# Patient Record
(demographics unavailable — no encounter records)

---

## 2024-10-09 NOTE — PHYSICAL EXAM
[Fully active, able to carry on all pre-disease performance without restriction] : Status 0 - Fully active, able to carry on all pre-disease performance without restriction [Normal] : affect appropriate [de-identified] : wears glasses

## 2024-10-09 NOTE — HISTORY OF PRESENT ILLNESS
[de-identified] : Mrs. Felton is a 72 year old female with R/R CLL (del 17p), with multiple prior therapies, most recently on Ibrutinib since 8/2014, who presents for routine follow-up. \par  \par  Disease: CLL, Dx 2000 \par  Tumor/ Prognostic Markers: mutated\par  CD38-, ZAP70-\par  del 13q, TP53 del.\par  +TP53 mutation, - NOTCH1, SF3B1. \par  \par  Current Treatment Status:. Current Therapy: Ibrutinib (8/2014)\par  11/2000-3/2001: Fludarabine x 4\par  6/01-9/01: FR x 3: sec inc WBC and splenomegaly-> [place]\par  7/09-9/09: FR x 3 due to inc WBC, massive splenomegaly and anemia-> [place]\par  2011: FR w minimal response\par  2012: FCR x 2-> minimal response. Rpt cytogenetic now w del 17p\par  5/12-7/13: [id number]: R+/-idela->[place]\par  8/13-2/14: [id number]: Idelalisib-> [place] complicated by colitis and pneumonitis\par  8/14-present: Ibrutinib ->[place]. \par  \par  \par  \par  \par  \par   \par   [de-identified] : 1/3/24 Pt presents today for follow up. Currently patient feel very well and has no complaints. Pt denies fever, chills or drenching night sweats. Good appetite. weight is stable. No chest pain or shortness of breath. No palpitations. No bleeding/bruising. Energy stable. No recent infections. She continues tolerate ibrutinib 140 mg 2 tabs qd. She reports no new enlarged/bothersome lymph nodes. No abd pain.   June 05, 2025 - Patient seen in a 1 month follow up visit for Leukocytopenia & Neutropenia. She remains asymptomatic, currently on Ibrutinib 140 mg PO qd.  She has mild intermittent bruising self-limiting resolves on its own.  Denies febrile illness, hospitalization, interval change in medical status, bleeding, weight loss, bone pain, swollen glands. She reports she performs self exams and monitors closely for any new findings, of lymph nodes / spleen enlargement.  7/17/24: Pt returns for follow up. She is feeling well. Offers no complaints. Remains on Ibrutinib 140mg daily. Pt denies fever, chills or drenching night sweats. Good appetite. weight is stable. No chest pain or shortness of breath. No palpitations. No bleeding/bruising. Energy stable. No recent infections.  10/9/24: Pt returns for follow up. She is feeling well. Offers no complaints. Remains on Ibrutinib 140mg daily. Pt denies fever, chills or drenching night sweats. Good appetite. weight is stable. No chest pain or shortness of breath. No palpitations. No bleeding/bruising. Energy stable. No recent infections.

## 2024-10-09 NOTE — ASSESSMENT
[FreeTextEntry1] : Mrs. Felton is a 74 year old female with R/R CLL (del 17p), with multiple prior therapies, most recently on Ibrutinib since 8/2014, who presents for routine follow-up. Patient has been tolerating Ibrutinib well.  #CLL -CBC reviewed, counts stable, WBC 3.52 HGB 13.1 PLT 77 (stable) ANC 2.43 ALC 0.66 -Continue Ibrutinib 1 tab daily -CMP, LDH, Uric acid also sent. Will follow up results.   #thrombocytopenia -Stable. Continue to monitor  #Routine Health Screening -continue general health screening and follow up as appropriate for age  OV 6 weeks

## 2024-10-09 NOTE — RESULTS/DATA
[FreeTextEntry1] : June 05, 2024 - CBC - WBC = 1.59, Hgb = 12.4, PLT 66 000,  ANC = 0.84,  ALC = 0.47  CMP, LDH, Uric Acid Results pending

## 2024-10-09 NOTE — REVIEW OF SYSTEMS
[Negative] : Allergic/Immunologic [FreeTextEntry7] : no GIB,  [de-identified] : intermittent bruising reports as self limiting

## 2025-01-10 NOTE — ASSESSMENT
[FreeTextEntry1] : Mrs. Felton is a 74 year old female with R/R CLL (del 17p), with multiple prior therapies, most recently on Ibrutinib since 8/2014, who presents for routine follow-up. Patient has been tolerating Ibrutinib well.  #CLL -CBC reviewed, counts stable, WBC 3.54 HGB 13.5 PLT 75 (stable) ANC 2.44 ALC 0.72 -Continue Ibrutinib 1 tab daily -CMP, LDH, Uric acid also sent. Will follow up results.   #thrombocytopenia -Stable. Continue to monitor  #Routine Health Screening -continue general health screening and follow up as appropriate for age  OV 6 weeks

## 2025-01-10 NOTE — PHYSICAL EXAM
[Fully active, able to carry on all pre-disease performance without restriction] : Status 0 - Fully active, able to carry on all pre-disease performance without restriction [Normal] : affect appropriate [de-identified] : wears glasses

## 2025-01-10 NOTE — REVIEW OF SYSTEMS
[Negative] : Allergic/Immunologic [FreeTextEntry7] : no GIB,  [de-identified] : intermittent bruising reports as self limiting

## 2025-05-15 NOTE — ASSESSMENT
[FreeTextEntry1] : Mrs. Felton is a 74 year old female with R/R CLL (del 17p), with multiple prior therapies, most recently on Ibrutinib since 8/2014, who presents for routine follow-up. Patient has been tolerating Ibrutinib well.  #CLL -CBC reviewed, counts stable, WBC 3.92 HGB 13.2 PLT 96 (stable)  -Continue Ibrutinib 1 tab daily -CMP, LDH,  also sent. Will follow up results.   #thrombocytopenia -Stable. Continue to monitor  #Routine Health Screening -continue general health screening and follow up as appropriate for age  OV 6 months

## 2025-05-15 NOTE — PHYSICAL EXAM
[Fully active, able to carry on all pre-disease performance without restriction] : Status 0 - Fully active, able to carry on all pre-disease performance without restriction [Normal] : affect appropriate [de-identified] : wears glasses

## 2025-05-15 NOTE — REVIEW OF SYSTEMS
[Negative] : Allergic/Immunologic [FreeTextEntry7] : no GIB,  [de-identified] : intermittent bruising reports as self limiting

## 2025-05-15 NOTE — HISTORY OF PRESENT ILLNESS
[de-identified] : Mrs. Felton is a 72 year old female with R/R CLL (del 17p), with multiple prior therapies, most recently on Ibrutinib since 8/2014, who presents for routine follow-up. \par  \par  Disease: CLL, Dx 2000 \par  Tumor/ Prognostic Markers: mutated\par  CD38-, ZAP70-\par  del 13q, TP53 del.\par  +TP53 mutation, - NOTCH1, SF3B1. \par  \par  Current Treatment Status:. Current Therapy: Ibrutinib (8/2014)\par  11/2000-3/2001: Fludarabine x 4\par  6/01-9/01: FR x 3: sec inc WBC and splenomegaly-> [place]\par  7/09-9/09: FR x 3 due to inc WBC, massive splenomegaly and anemia-> [place]\par  2011: FR w minimal response\par  2012: FCR x 2-> minimal response. Rpt cytogenetic now w del 17p\par  5/12-7/13: [id number]: R+/-idela->[place]\par  8/13-2/14: [id number]: Idelalisib-> [place] complicated by colitis and pneumonitis\par  8/14-present: Ibrutinib ->[place]. \par  \par  \par  \par  \par  \par   \par   [de-identified] : 1/3/24 Pt presents today for follow up. Currently patient feel very well and has no complaints. Pt denies fever, chills or drenching night sweats. Good appetite. weight is stable. No chest pain or shortness of breath. No palpitations. No bleeding/bruising. Energy stable. No recent infections. She continues tolerate ibrutinib 140 mg 2 tabs qd. She reports no new enlarged/bothersome lymph nodes. No abd pain.   June 05, 2025 - Patient seen in a 1 month follow up visit for Leukocytopenia & Neutropenia. She remains asymptomatic, currently on Ibrutinib 140 mg PO qd.  She has mild intermittent bruising self-limiting resolves on its own.  Denies febrile illness, hospitalization, interval change in medical status, bleeding, weight loss, bone pain, swollen glands. She reports she performs self exams and monitors closely for any new findings, of lymph nodes / spleen enlargement.  7/17/24: Pt returns for follow up. She is feeling well. Offers no complaints. Remains on Ibrutinib 140mg daily. Pt denies fever, chills or drenching night sweats. Good appetite. weight is stable. No chest pain or shortness of breath. No palpitations. No bleeding/bruising. Energy stable. No recent infections.  10/9/24: Pt returns for follow up. She is feeling well. Offers no complaints. Remains on Ibrutinib 140mg daily. Pt denies fever, chills or drenching night sweats. Good appetite. weight is stable. No chest pain or shortness of breath. No palpitations. No bleeding/bruising. Energy stable. No recent infections.  1/8/25: Pt returns for follow up. She is feeling well. Offers no complaints. Remains on Ibrutinib 140mg daily. Pt denies fever, chills or drenching night sweats. Good appetite. weight is stable. No chest pain or shortness of breath. No palpitations. No bleeding/bruising. Energy stable. No recent infections.  5/14/25:  Pt returns for follow up. She is feeling well. Offers no complaints. Remains on Ibrutinib 140mg daily. Pt denies fever, chills or drenching night sweats. Good appetite. Gained weight. No chest pain or shortness of breath. No palpitations. No bleeding/bruising. Energy stable. No recent infections. No new or worsening LAD